# Patient Record
Sex: FEMALE | Race: OTHER | HISPANIC OR LATINO | URBAN - METROPOLITAN AREA
[De-identification: names, ages, dates, MRNs, and addresses within clinical notes are randomized per-mention and may not be internally consistent; named-entity substitution may affect disease eponyms.]

---

## 2018-10-08 ENCOUNTER — EMERGENCY (EMERGENCY)
Facility: HOSPITAL | Age: 18
LOS: 1 days | Discharge: HOME | End: 2018-10-08
Attending: EMERGENCY MEDICINE
Payer: COMMERCIAL

## 2018-10-08 VITALS
SYSTOLIC BLOOD PRESSURE: 118 MMHG | DIASTOLIC BLOOD PRESSURE: 74 MMHG | RESPIRATION RATE: 14 BRPM | HEIGHT: 62 IN | WEIGHT: 210.1 LBS | TEMPERATURE: 98 F | HEART RATE: 74 BPM | OXYGEN SATURATION: 100 %

## 2018-10-08 LAB
HCG SERPL-ACNC: <1 MIU/ML — SIGNIFICANT CHANGE UP
HIV 1 & 2 AB SERPL IA.RAPID: SIGNIFICANT CHANGE UP

## 2018-10-08 PROCEDURE — 36415 COLL VENOUS BLD VENIPUNCTURE: CPT

## 2018-10-08 PROCEDURE — 84702 CHORIONIC GONADOTROPIN TEST: CPT

## 2018-10-08 PROCEDURE — 99284 EMERGENCY DEPT VISIT MOD MDM: CPT

## 2018-10-08 PROCEDURE — 86703 HIV-1/HIV-2 1 RESULT ANTBDY: CPT

## 2018-10-08 PROCEDURE — 99283 EMERGENCY DEPT VISIT LOW MDM: CPT

## 2018-10-08 RX ORDER — DIPHENHYDRAMINE HCL 50 MG
2 CAPSULE ORAL
Qty: 40 | Refills: 0 | OUTPATIENT
Start: 2018-10-08 | End: 2018-10-12

## 2018-10-08 RX ORDER — EPINEPHRINE 0.3 MG/.3ML
0.3 INJECTION INTRAMUSCULAR; SUBCUTANEOUS
Qty: 1 | Refills: 0 | OUTPATIENT
Start: 2018-10-08

## 2018-10-08 NOTE — ED PROVIDER NOTE - PROGRESS NOTE DETAILS
Reevaluated patient at bedside.  Patient still asymptomatic.  Discussed the results of bloodwork and copies of results given.  An opportunity to ask questions was given.  Discussed the importance of prompt, close medical follow-up.  Patient will return with any changes, concerns or persistent / worsening symptoms.  Understanding of all instructions verbalized. Pt doing well, is completely asymptomatic. No acute co or changes. Dw pt re allergic reaction prec /inst, importance of close, prompt outpt fu and to return with any changes or concerns.

## 2018-10-08 NOTE — ED ADULT NURSE NOTE - OBJECTIVE STATEMENT
BIBEMS. Present to Er with c/o of allergic reaction. Pt does not know what she is allergic to. Pt c/o of itching and generalized rash. EMT gave pt. epipen, steroid and respiratory treatment. Denies any shortness of breath.

## 2018-10-08 NOTE — ED ADULT NURSE NOTE - NSIMPLEMENTINTERV_GEN_ALL_ED
Implemented All Universal Safety Interventions:  [place] to call system. Call bell, personal items and telephone within reach. Instruct patient to call for assistance. Room bathroom lighting operational. Non-slip footwear when patient is off stretcher. Physically safe environment: no spills, clutter or unnecessary equipment. Stretcher in lowest position, wheels locked, appropriate side rails in place.

## 2018-10-08 NOTE — ED ADULT TRIAGE NOTE - CHIEF COMPLAINT QUOTE
S/P Allergic reaction. Patient don't know what is she allergic too. C/O itching in the throat and generalized rash. Epinephrine 0.3 mg, I/M, Decadron 12 mg IVP and albuterol nebulizer given by EMT

## 2018-10-08 NOTE — ED PROVIDER NOTE - OBJECTIVE STATEMENT
pt bib ems for allergic reaction. pt reports developed itchy rash to torso, extremities at work, developed wheezing, took benadryl 50mg, given steroids, neb and epipen by ems. symptoms now resolved. pt denies lip mouth, tongue, throat swelling or itching, diff breathing or swallowing, cp, abd pain, d/n/v. no new foods or other known exposures  pmd - robby

## 2018-10-08 NOTE — ED PROVIDER NOTE - CHPI ED SYMPTOMS NEG
no cough/no difficulty swallowing/no swelling of face, tongue/no vomiting/no throat itching/no nausea/no shortness of breath/no difficulty breathing

## 2020-01-20 ENCOUNTER — EMERGENCY (EMERGENCY)
Facility: HOSPITAL | Age: 20
LOS: 1 days | Discharge: HOME | End: 2020-01-20
Attending: EMERGENCY MEDICINE | Admitting: EMERGENCY MEDICINE
Payer: SELF-PAY

## 2020-01-20 VITALS
OXYGEN SATURATION: 100 % | SYSTOLIC BLOOD PRESSURE: 114 MMHG | DIASTOLIC BLOOD PRESSURE: 75 MMHG | RESPIRATION RATE: 16 BRPM | TEMPERATURE: 98 F | HEART RATE: 98 BPM

## 2020-01-20 DIAGNOSIS — F43.22 ADJUSTMENT DISORDER WITH ANXIETY: ICD-10-CM

## 2020-01-20 PROCEDURE — 90792 PSYCH DIAG EVAL W/MED SRVCS: CPT

## 2020-01-20 PROCEDURE — 99283 EMERGENCY DEPT VISIT LOW MDM: CPT

## 2020-01-20 PROCEDURE — 99053 MED SERV 10PM-8AM 24 HR FAC: CPT

## 2020-01-20 NOTE — ED BEHAVIORAL HEALTH ASSESSMENT NOTE - SUICIDE PROTECTIVE FACTORS
Engaged in work or school/Supportive social network of family or friends/Responsibility to family and others/Identifies reasons for living/Has future plans

## 2020-01-20 NOTE — ED BEHAVIORAL HEALTH NOTE - BEHAVIORAL HEALTH NOTE
Per request of provider, SW provided patient with [employer] #[id number] and #[id number] for discharge.
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
## 2020-01-20 NOTE — ED BEHAVIORAL HEALTH ASSESSMENT NOTE - SAFETY PLAN ADDT'L DETAILS
Education provided regarding environmental safety / lethal means restriction/Safety plan discussed with.../Provision of National Suicide Prevention Lifeline 9-496-121-TALK (9072)

## 2020-01-20 NOTE — ED BEHAVIORAL HEALTH ASSESSMENT NOTE - DESCRIPTION
Vital Signs Last 24 Hrs  T(C): 36.9 (20 Jan 2020 06:36), Max: 36.9 (20 Jan 2020 06:36)  T(F): 98.4 (20 Jan 2020 06:36), Max: 98.4 (20 Jan 2020 06:36)  HR: 98 (20 Jan 2020 06:36) (98 - 98)  BP: 114/75 (20 Jan 2020 06:36) (114/75 - 114/75)  BP(mean): --  RR: 16 (20 Jan 2020 06:36) (16 - 16)  SpO2: 100% (20 Jan 2020 06:36) (100% - 100%) none calm, cooperative, no agitation, no prns    Vital Signs Last 24 Hrs  T(C): 36.9 (20 Jan 2020 06:36), Max: 36.9 (20 Jan 2020 06:36)  T(F): 98.4 (20 Jan 2020 06:36), Max: 98.4 (20 Jan 2020 06:36)  HR: 98 (20 Jan 2020 06:36) (98 - 98)  BP: 114/75 (20 Jan 2020 06:36) (114/75 - 114/75)  BP(mean): --  RR: 16 (20 Jan 2020 06:36) (16 - 16)  SpO2: 100% (20 Jan 2020 06:36) (100% - 100%) see HPI

## 2020-01-20 NOTE — ED BEHAVIORAL HEALTH ASSESSMENT NOTE - REFERRAL / APPOINTMENT DETAILS
[facility], available for walk-in Monday-Friday 9am-7pm and [facility]

## 2020-01-20 NOTE — ED PROVIDER NOTE - PATIENT PORTAL LINK FT
You can access the FollowMyHealth Patient Portal offered by [facility] by registering at the following website: [email or web address]. By joining [facility]’s FollowMyHealth portal, you will also be able to view your health information using other applications (apps) compatible with our system.

## 2020-01-20 NOTE — ED PROVIDER NOTE - NSFOLLOWUPCLINICS_GEN_ALL_ED_FT
[facility]  Behavioral Health  [id number] [place] [address]  Phone: [phone]  Fax:   Follow Up Time:
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
## 2020-01-20 NOTE — ED BEHAVIORAL HEALTH ASSESSMENT NOTE - RISK ASSESSMENT
Low Acute Suicide Risk Risk factors are recent anxiety, depressive sx with poor coping skills and cutting behavior.  No acute SI, no hx of suicide attempts and future oriented, motivated for treatment and identifies reasons for living, able to safety plan
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
## 2020-01-20 NOTE — ED PROVIDER NOTE - SKIN, MLM
Skin normal color for race, warm, dry and intact. No evidence of rash. +old linear superficial abrasions, 2 fresh superficial abrasions with no possibility of abrasions.

## 2020-01-20 NOTE — ED BEHAVIORAL HEALTH ASSESSMENT NOTE - SUICIDE RISK FACTORS
History of abuse/trauma/Current mood episode Agitation/Severe Anxiety/Panic/Current mood episode/History of abuse/trauma
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
## 2020-01-20 NOTE — ED BEHAVIORAL HEALTH ASSESSMENT NOTE - ACTIVATING EVENTS/STRESSORS
Non-compliant or not receiving treatment Non-compliant or not receiving treatment/Triggering events leading to humiliation, shame, and/or despair (e.g. Loss of relationship, financial or health status) (real or anticipated)

## 2020-01-21 NOTE — ED BEHAVIORAL HEALTH NOTE - BEHAVIORAL HEALTH NOTE
MONIKA PETERSON HIGH RISK FOLLOW UP:     Writer contacted pt [phone]. Writer able to speak with pt who reported feeling better. Pt denied any SI/HI or concerns. Pt pursuing [facility] for outpatient care and was provided with number to medical records to access records further. Pt accepting of information. No concerns reported.

## 2022-07-29 NOTE — ED BEHAVIORAL HEALTH ASSESSMENT NOTE - NS ED B[facility] ED COURSE [facility] [place] RESTRAINTS IN ED YN
[de-identified] : Physical Exam:\par General: Well appearing, no acute distress\par Neurologic: A&Ox3, No focal deficits\par Head: NCAT without abrasions, lacerations, or ecchymosis to head, face, or scalp\par Eyes: No scleral icterus, no gross abnormalities\par Respiratory: Equal chest wall expansion bilaterally, no accessory muscle use\par Lymphatic: No lymphadenopathy palpated\par Skin: Warm and dry\par Psychiatric: Normal mood and affect\par \par Left hip\par \par Tenderness at top of acromion. No ASIS/AIIS tenderness. Normal ER, 120 minimal pain with resistance,  good adductor strength, weakness hip flexion, no pain with abduction.\par On inspection of the left hip shows no gross abnormalities. No loss muscle volume. Skin appears normal. When asked to point at the most painful part of their hip, they make a C sign\par Negative Heel strike, negative log roll. \par Gluteus pain. External rotation with pain. Hip flexor strength is 4-5 because of pain.  Hip impingement signs are positive. \par Resisted straight leg raise does not produce groin pain. No signs of the iliopsoas tendinitis. BILLY Test (Zack's Test): positive ; FADIR Test (Labral Tear/JUAQUIN): Negative. Stinchfield: positive \par No audible or palpable clicking. \par On lateral decubitus examination there is no focal area of her greater trochanteric tenderness. Abductor strength is 5 out of 5.\par \par Motor strength is intact distally, 5/5 over quads,hamstring, EHL, FHL, GSC, TA.\par Sensation intact over L1-S1 dermatomes\par \par Right hip Exam\par \par ·	Skin: Clean/dry and intact\par ·	Inspection: No obvious deformity, no swelling.\par ·	Pulses: 2+ DP/PT pulses\par ·	ROM: 130 flexion without pain. Internal rotation to 15. External rotation to 45.\par ·	Painful ROM: None\par ·	Tenderness: No tenderness over greater trochanter/glut medius insertion. No groin pain. No ttp over the ASIS/[place] crest.\par ·	Strength: 5/5 ADD/ABD/Q/H/TA/GS/EHL\par ·	Neuro: Sensation in tact to light touch throughout, DTR normal\par ·	Additional tests: Negative impingement test  [de-identified] : Bilateral AP, frog leg, Shen views and false profile views of left hip were reviewed today in the office. They demonstrate no fracture, no deformity, no dislocation, no bony lesions. Lateral 35, alpha 56.\par \par  \par \par  No

## 2023-02-14 NOTE — ED ADULT NURSE NOTE - NSWEIGHTCALCTOOLDRUG_GEN_A_CORE
Pt. with anemia in the setting of ESRD. Hgb (8.7) is below goal for ESRD. Recommend to check iron panel. Monitor Hgb.  used

## 2024-11-06 NOTE — ED BEHAVIORAL HEALTH ASSESSMENT NOTE - AFFECT RANGE
his is a 40-year-old female last seen  Jan 2024 and prior back in 2017 and being referred from the [facility] rheumatology Rossy Alicia to see us for hep b liver issues and on tdf now.  Copy of the note will be sent to referring providers.  8/6/24 She is doing well.  The 2/9/24 ultrasound was sent to me.  The liver appeared homogeneous in echotexture that see lesions.  The hepatic vascular is patent.  Common bile duct is 5 mm, upper limits of normal in size.  Spleen 9.9 cm and this is normal.  Overall they noted that the hepatic vascular is patent and the liver appeared normal.  Overall this seems stable compared to the prior.  Will review at the follow-up. CARMINE Romero  [facility] last done in may she is loosing weight and had colon and egd and had polyps and gastritis and seeing them she notes the viread causes a lot of nausea and loosing weight can try vemlidy she needs to send us labs  she has spondylitis now and dx with ra. seeing rheumatologist friday. on plaquenil bid(this is for her sjrogen and raynauds)  she will be seein [facility] she will be due in sept   recap 1/5/24 telemed Dear Apryl Haley,   The 12/27/2023 were found in epic. The sodium 138, potassium 3.4, bilirubin 0.8, alkaline phosphatase 46, AST 23, ALT 29.Hepatitis B viral .Complete blood count showing white blood cells 4.86, hemoglobin 13, MCV 94, platelets 278. Prior 10/9/23 labs with Sodium was 140 potassium 3.6 chloride 107 CO2 of 26 BUN of 10 creatinine 0.8 total protein 7.4 bilirubin 0.9 alk phos 50 AST 55 and ALT 97 with a calcium 9.5. Prior labs to be saw in August 15 had shown then an alk phos of 49, AST of 75,  so these are lower. HBV prior was [id number] so lower now. Happy to see this.   still taking it at night--it causes nausea so this is better for her.  she is feeling well  no missed doses  she needs us due in jan 2024.   recap 10/17/23 labs Dear Apryl Haley, Local labs dated October 9 sent into us today. Sodium was 140 potassium 3.6 chloride 107 CO2 of 26 BUN of 10 creatinine 0.8 total protein 7.4 bilirubin 0.9 alk phos 50 AST 55 and ALT 97 with a calcium 9.5. Prior labs to be saw in August 15 had shown then an alk phos of 49, AST of 75,  so these are lower.  Your creatinine had been 0.8 so that appears to be stable.  hbv [id number], previously in august level was [id number]  asked about if missed doses and maybe 2-3  she did have a flare last month as well so could explain the labs     recap The 8/15/2023 labs were sent to me. The sodium 136 potassium 3.3, creatinine 0.8, bilirubin 0.8, alkaline phosphatase 49, AST 75, .  The IgA 388, IgG 1574, IgM 114.  The hepatitis B surface antigen is positive.  They did not run a hepatitis B viral DNA.  Previously though back in July the alkaline phosphatase is 49 and the AST 94, .  So the liver enzymes are definitely lower and i suspect the hbv dna is still pending and we will follow up when we have that result. I called and discussed these with you as  was out of the office but sending you this as well for you records.  Donna Moss PA-C Dear Apryl Haley, August 15 labs showed sodium 136 potassium low at 3.3 and you should have like a banana twice a day, or V8 juice twice a day or tomato juice twice a day for 3 days to see if that will correct that. Chloride 105 CO2 of 26 BUN of 7 creatinine 0.8. Sugar was 146 elevated and may be not fasting that day?  Please share with primary provider. Albumin normal 4.1, bilirubin 0.8 alk phos 49 AST 75 and .  Prior July labs had shown an AST of 94 and ALT of 202 so this appears to be coming down.  Prior creatinine was 0.79 so that is the same.  Previous hep B level as we talked about had been higher at 6.8 million so overall labs moving in the right direction there. Your hep B surface antigen was noted to be positive which again not a surprise that you have been chronically positive.  The hep B level as you saw yesterday had come back at 135,000 The immunoglobulins which are looking for other signs of immune issues showed that your IgG is normal at 1574 and IgM normal at 114 IgA normal at 388. WBC was 4.74, hemoglobin 12.9 and platelet count was 262.  Your MCV was a little bit up at 96.  Your MCH was a little low at 35. Neutrophils were 2.11 and lymphocytes were 2.21. I would redo the labs 2 weeks after these and then again in 4 weeks and then at that point if they remain stable we can slow down the labs after that.  Usually the first month or two is  that timeframe during which we need to carefully watch and look for any flares or changes on the labs. Dr Faisal Haley, Curiously they did send this to the [facility] lab that you did previously on August 15.  It was as you stated to 135,000 for the hepatitis B virus level. The July virus level was 6.8 million so clearly lower now. Need to follow this over time. Dr. Faisal Haley, More labs sent in from August 15, 2023 showing that the hep B surface antigen was positive which is not surprising.  We will see what the DNA is doing with the treatment. Dr Faisal Haley, August 15 labs were sent in showing that your IgA was normal at 388, IgG normal at 1574, and IgM normal at 114. Other labs showed sodium 136 potassium 3.3 chloride 105 CO2 26 BUN of 7 creatinine 0.8 and glucose was elevated at 146 and possibly you were not fasting that day?  Albumin was normal at 4.1, bilirubin 0.8, alk phos 49 and AST 75 and  with calcium 9.5. WBC 4.74, hemoglobin 12.9, platelet count 262 with an MCV of 96 which is elevated.  Neutrophils 2.11 and lymphocytes 2.21 normal. As you recall, you had recently started the tenofovir DF and your July 14 at labs had shown an AST that was 94 and an ALT of 202 which were higher so these are coming down. We really need to see what the hepatitis B level comes back. Your previous creatinine was 0.79 and this is approximate the same at 0.8. Please redo labs again for us again in another 2 weeks and 4 weeks. We hope to see these labs continue to drop more and they should on this medicine. Dr. Malone  She did get the TDF and delivered to her and she has been on it for 4 days. Not able to get samples pre the treatment.  She may want to take it with food. She also comments size if larger and with her sjogrens some difficulty swallowing it and if persists may need to appeal for the much smaller vemlidy.  She says she was previously diagnosed AIH and was diagnosed not by bx but by labs and hbv is what we saw.  AIH has markers that suggest it but many diseases can generate the markers and even medicines as well. Many times a bx is needed to confirm.  July 24th ultrasound unfortunately came back after you and I spoke. Pancreas was unremarkable. Liver was homogeneous/even in echotexture with no discrete liver lesions. Liver vessels patent which was good to see. Common bile duct was normal at 3 mm. Right kidney 10.5 cm no hydronephrosis. Spleen normal at 9.8 cm. Splenic vessels were patent as well. Overall good to see that everything remains stable and lets get you started on the hepatitis B medicine and reassess issues.   July 18th: finally hbv labs showing hep B level at 6.8 million. July 14 labs showed sodium 138 potassium 3.6 chloride 106 CO2 26 BUN of 11 creatinine 0.79 total bilirubin 0.7 alk phos 49 AST of 94 .  Calcium 8.8.  Hep B surface antigen was confirmed positive.   Quantitative immunoglobulins showed IgG normal at 1417, IgM normal 119 and IgA normal at 343.  This suggests less likely for an immune driven event. WBC 4.71 hemoglobin 11.5 platelet count 245 with an MCV elevated slightly at 99. The treatment criteria for hepatitis B is to have a DNA level over 2000, and an abnormal ALT over 2 review had both.  She needs to remain on TDF to control the hep b and can then avoid worsening other isp.  We do need to follow labs in 2 and 4 and 6 weeks and look for an immune flare as drop the viral load can see.  Per the notes: Patient apparently has a history of Sjogren's syndrome with inflammatory arthritis and also reported history of autoimmune hepatitis.  I did not see hep b mentioned in the info that we saw.  Patient listed as being on a variety of medicines including artificial tears as needed, magnesium 400 mg twice daily, fish oil 1000 mg a day, vitamin D 10,000 units a day for 1 month and then 2000 units a day, she has a copper IUD, Prozac 20 mg a day, Synthroid 125 mcg a day, hydrochlorothiazide telmisartan 12.5/40 mg a day, and Plaquenil 200 mg a day.  She is on plaquenil for her rheum issues and also dxd no RA and re that she has not reseen rheum for that and seen by whitney monzon.  Rheumatoid factor can be false positive.  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  Surgical history includes thyroid lobectomy in the past, cholecystectomy in the past, left fifth toe x2 hammertoe surgery, right knee meniscal repair, breast augmentation in 2002.  Family history is that she is adopted but biologic mother had a history of hep b and more recently learned of RA/ autoimmune disorders.  Social history lives with daughter/niece 16/13 with autism and has son who is 6-year-old child with mild cerebral palsy. Not a smoker.  Infrequent alcohol.  Works as a optometry technician.  Retired 20 years from [employer].  Allergies none.  Last major diagnostic procedures/hospitalization was in arthritis third 2015.  Note mentions that the patient's Sjogren's arthritis is also accompanied by possible autoimmune hepatitis and has a history of hep B felt to be related from vertical transmission from both mother.    They mentioned she has not been on CellCept.  In the notes she knew that she saw us many years ago.  Recent labs showed sed rate 19, aldolase 6.2, CPK 68, gamma GTP 40, WBC 6.4 hemoglobin 12.6 platelet count 272 MCV 94.9 neutrophils 3392 and lymphocytes 35.9.  Sodium 139 potassium 4.0 glucose 88 BUN 13 creatinine 0.8 calcium 9.5 bilirubin 0.6 alk phos 61 AST 56  albumin 4.5  She says for 3m just seeing Dr Alicia and so is establishing with her and says maybe for the last 3-4 m labs have been up and so that is being.  She denied turmeric or green tea or gsm.  She says 8 yrs ago she had a flare and that led to referral then and when seen not felt due to aih and maybe due to hep and labs settled down.   Weight listed as being had a BMI of 24.54.   September 2017 labs showed glucose 88 BUN of 7 creatinine 0.73 sodium 141 potassium 3.8 calcium 9.5 albumin 4.2 bilirubin 0.6 alk phos 59 AST 19 ALT 21 WBC 5.7 hemoglobin 13.1 plate count 233 MCV 95.5. Last visit note that I could find was from September 27, 2017 Harris states that she had the history of the hepatitis B.  Was on.  At that time.  Hep B level was 550 then AST 19 ALT 21 bilirubin 0.6 alk phos 59  Notes mention that she had asthma and was using however, and that her blood pressure has been variable at that time.    Had previously seen Dr. CHEN for irritable bowel.  Hep A immunity had been seen.  Reported initial biopsy stage III in the past.   Colonoscopy with polyp in 2015.
Full